# Patient Record
Sex: MALE | Race: WHITE | Employment: UNEMPLOYED | URBAN - METROPOLITAN AREA
[De-identification: names, ages, dates, MRNs, and addresses within clinical notes are randomized per-mention and may not be internally consistent; named-entity substitution may affect disease eponyms.]

---

## 2022-03-09 ENCOUNTER — TELEPHONE (OUTPATIENT)
Dept: FAMILY MEDICINE CLINIC | Age: 37
End: 2022-03-09

## 2022-03-09 DIAGNOSIS — K04.7 DENTAL INFECTION: Primary | ICD-10-CM

## 2022-03-09 RX ORDER — PENICILLIN V POTASSIUM 500 MG/1
500 TABLET, FILM COATED ORAL 3 TIMES DAILY
Qty: 42 TABLET | Refills: 0 | Status: SHIPPED | OUTPATIENT
Start: 2022-03-09 | End: 2022-03-29 | Stop reason: SDUPTHER

## 2022-03-29 ENCOUNTER — TELEPHONE (OUTPATIENT)
Dept: FAMILY MEDICINE CLINIC | Age: 37
End: 2022-03-29

## 2022-03-29 DIAGNOSIS — K04.7 DENTAL INFECTION: ICD-10-CM

## 2022-03-29 RX ORDER — IBUPROFEN 800 MG/1
800 TABLET ORAL
Qty: 45 TABLET | Refills: 0 | Status: SHIPPED | OUTPATIENT
Start: 2022-03-29

## 2022-03-29 RX ORDER — PENICILLIN V POTASSIUM 500 MG/1
500 TABLET, FILM COATED ORAL 3 TIMES DAILY
Qty: 42 TABLET | Refills: 0 | Status: SHIPPED | OUTPATIENT
Start: 2022-03-29 | End: 2022-04-12

## 2022-03-29 NOTE — TELEPHONE ENCOUNTER
Message forwarded to Dr Amos to see if he is able to complete forms or if pt needs to bring forms and appt needed.   Yosef pt. Has another infection in his tooth. RF ore for Pen VK with motrin for pain relief, sandra well in past. Will be seeing dental soon.

## 2022-04-27 DIAGNOSIS — F41.9 ANXIETY: Primary | ICD-10-CM

## 2022-04-27 RX ORDER — TRAZODONE HYDROCHLORIDE 50 MG/1
50 TABLET ORAL
Qty: 30 TABLET | Refills: 5 | Status: SHIPPED | OUTPATIENT
Start: 2022-04-27 | End: 2022-05-18 | Stop reason: SDUPTHER

## 2022-04-27 NOTE — PROGRESS NOTES
shelter pt. Having increased anxiety and difficulty sleeping at night. Ordered trazodone to help relieve sx. Will con't to monitor with nursing home nurse.

## 2022-05-18 ENCOUNTER — TELEPHONE (OUTPATIENT)
Dept: FAMILY MEDICINE CLINIC | Age: 37
End: 2022-05-18

## 2022-05-18 DIAGNOSIS — F41.9 ANXIETY: ICD-10-CM

## 2022-05-18 RX ORDER — TRAZODONE HYDROCHLORIDE 100 MG/1
100 TABLET ORAL
Qty: 30 TABLET | Refills: 5 | Status: SHIPPED | OUTPATIENT
Start: 2022-05-18

## 2022-05-18 NOTE — TELEPHONE ENCOUNTER
Yosef pt. Has anxiety, difficulty sleeping. Started trazodone about 1mo ago, sandra well, but not strong enough, would like to boost dose.  Boost to 100mg qhs.

## 2022-07-22 ENCOUNTER — TELEPHONE (OUTPATIENT)
Dept: FAMILY MEDICINE CLINIC | Age: 37
End: 2022-07-22

## 2022-07-22 DIAGNOSIS — K04.7 DENTAL INFECTION: Primary | ICD-10-CM

## 2022-07-22 RX ORDER — PENICILLIN V POTASSIUM 500 MG/1
500 TABLET, FILM COATED ORAL 3 TIMES DAILY
Qty: 30 TABLET | Refills: 0 | Status: SHIPPED | OUTPATIENT
Start: 2022-07-22 | End: 2022-08-01

## 2022-07-22 NOTE — TELEPHONE ENCOUNTER
Yosef pt. [facility] medical reports pt with infected tooth #15. Dental consult pending. Will tx with pen VK 500mg TID x10d.